# Patient Record
Sex: MALE | Race: WHITE | NOT HISPANIC OR LATINO | URBAN - METROPOLITAN AREA
[De-identification: names, ages, dates, MRNs, and addresses within clinical notes are randomized per-mention and may not be internally consistent; named-entity substitution may affect disease eponyms.]

---

## 2019-02-21 ENCOUNTER — OFFICE VISIT (OUTPATIENT)
Dept: URBAN - METROPOLITAN AREA CLINIC 29 | Facility: CLINIC | Age: 69
End: 2019-02-21
Payer: MEDICARE

## 2019-02-21 PROCEDURE — 99213 OFFICE O/P EST LOW 20 MIN: CPT | Performed by: OPTOMETRIST

## 2019-02-21 RX ORDER — ACYCLOVIR 400 MG/1
400 MG TABLET ORAL
Qty: 60 | Refills: 3 | Status: INACTIVE
Start: 2019-02-21 | End: 2019-05-01

## 2019-02-21 RX ORDER — GANCICLOVIR 1.5 MG/G
0.15 % GEL OPHTHALMIC
Qty: 1 | Refills: 4 | Status: INACTIVE
Start: 2019-02-21 | End: 2019-03-27

## 2019-02-21 NOTE — IMPRESSION/PLAN
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 Plan: Discussed diagnosis in detail with patient. Discussed treatment options with patient. New medication(s) Rx given today. Will continue to observe condition and or symptoms. Patient instructed to call if condition gets worse. Reassured patient of current condition and treatment.

## 2019-02-28 ENCOUNTER — OFFICE VISIT (OUTPATIENT)
Dept: URBAN - METROPOLITAN AREA CLINIC 29 | Facility: CLINIC | Age: 69
End: 2019-02-28
Payer: MEDICARE

## 2019-02-28 PROCEDURE — 99213 OFFICE O/P EST LOW 20 MIN: CPT | Performed by: OPTOMETRIST

## 2019-03-20 ENCOUNTER — OFFICE VISIT (OUTPATIENT)
Dept: URBAN - METROPOLITAN AREA CLINIC 29 | Facility: CLINIC | Age: 69
End: 2019-03-20
Payer: MEDICARE

## 2019-03-20 PROCEDURE — 99213 OFFICE O/P EST LOW 20 MIN: CPT | Performed by: OPTOMETRIST

## 2019-03-20 NOTE — IMPRESSION/PLAN
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 Plan: Discussed diagnosis in detail with patient. Discussed treatment options with patient. Continue using current medication(s). Taper medication(s) as instructed. Will continue to observe condition and or symptoms. Reassured patient of current condition and treatment.

## 2019-03-27 ENCOUNTER — OFFICE VISIT (OUTPATIENT)
Dept: URBAN - METROPOLITAN AREA CLINIC 29 | Facility: CLINIC | Age: 69
End: 2019-03-27
Payer: MEDICARE

## 2019-03-27 DIAGNOSIS — H25.13 AGE-RELATED NUCLEAR CATARACT, BILATERAL: ICD-10-CM

## 2019-03-27 PROCEDURE — 92014 COMPRE OPH EXAM EST PT 1/>: CPT | Performed by: OPTOMETRIST

## 2019-03-27 RX ORDER — GANCICLOVIR 1.5 MG/G
0.15 % GEL OPHTHALMIC
Qty: 1 | Refills: 4 | Status: INACTIVE
Start: 2019-03-27 | End: 2019-05-01

## 2019-03-27 ASSESSMENT — INTRAOCULAR PRESSURE
OS: 14
OD: 14

## 2019-03-27 NOTE — IMPRESSION/PLAN
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 Condition: established, stable. Plan: Discussed diagnosis in detail with patient. Discussed treatment options with patient. Taper medication(s) as instructed. Will continue to observe condition and or symptoms.

## 2019-03-27 NOTE — IMPRESSION/PLAN
Impression: Age-related nuclear cataract, bilateral: H25.13 OU. Condition: established, stable. Plan: Discussed diagnosis in detail with patient. No treatment is required at this time. Will continue to observe condition and or symptoms. Reassured patient of current condition and treatment. Call if Symptoms occur.

## 2019-05-01 ENCOUNTER — OFFICE VISIT (OUTPATIENT)
Dept: URBAN - METROPOLITAN AREA CLINIC 29 | Facility: CLINIC | Age: 69
End: 2019-05-01
Payer: MEDICARE

## 2019-05-01 PROCEDURE — 99213 OFFICE O/P EST LOW 20 MIN: CPT | Performed by: OPTOMETRIST

## 2019-05-01 NOTE — IMPRESSION/PLAN
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 Plan: Discussed diagnosis in detail with patient. Discussed treatment options with patient. Continue using current medication(s).

## 2019-05-22 ENCOUNTER — TESTING ONLY (OUTPATIENT)
Dept: URBAN - METROPOLITAN AREA CLINIC 29 | Facility: CLINIC | Age: 69
End: 2019-05-22

## 2019-05-22 DIAGNOSIS — H52.13 MYOPIA, BILATERAL: Primary | ICD-10-CM

## 2019-05-22 PROCEDURE — 92310 CONTACT LENS FITTING OU: CPT | Performed by: OPTOMETRIST

## 2019-05-22 ASSESSMENT — VISUAL ACUITY
OD: 20/40
OS: 20/20

## 2019-12-31 ENCOUNTER — OFFICE VISIT (OUTPATIENT)
Dept: URBAN - METROPOLITAN AREA CLINIC 29 | Facility: CLINIC | Age: 69
End: 2019-12-31
Payer: MEDICARE

## 2019-12-31 PROCEDURE — 99213 OFFICE O/P EST LOW 20 MIN: CPT | Performed by: OPTOMETRIST

## 2019-12-31 RX ORDER — GANCICLOVIR 1.5 MG/G
0.15 % GEL OPHTHALMIC
Qty: 1 | Refills: 4 | Status: INACTIVE
Start: 2019-12-31 | End: 2020-01-30

## 2019-12-31 NOTE — IMPRESSION/PLAN
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 Plan: Discussed diagnosis in detail with patient. Discussed treatment options with patient. No change to current treatment. Will continue to observe condition and or symptoms. Continue using current medication(s). Medication refill given today. Emphasized and explained compliance.

## 2020-01-30 ENCOUNTER — OFFICE VISIT (OUTPATIENT)
Dept: URBAN - METROPOLITAN AREA CLINIC 29 | Facility: CLINIC | Age: 70
End: 2020-01-30
Payer: MEDICARE

## 2020-01-30 DIAGNOSIS — B00.52 HERPESVIRAL KERATITIS: Primary | ICD-10-CM

## 2020-01-30 PROCEDURE — 99213 OFFICE O/P EST LOW 20 MIN: CPT | Performed by: OPTOMETRIST

## 2020-01-30 NOTE — IMPRESSION/PLAN
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 Condition: established, stable. Plan: Discussed diagnosis in detail with patient. Discussed treatment options with patient. Taper medication(s) and or Discontinue Med(s) as instructed.

## 2020-03-20 ENCOUNTER — OFFICE VISIT (OUTPATIENT)
Dept: URBAN - METROPOLITAN AREA CLINIC 29 | Facility: CLINIC | Age: 70
End: 2020-03-20
Payer: MEDICARE

## 2020-03-20 DIAGNOSIS — S05.01XA CORNEAL ABRASION W/O FB OF RIGHT EYE, INITIAL ENCOUNTER: Primary | ICD-10-CM

## 2020-03-20 PROCEDURE — 99213 OFFICE O/P EST LOW 20 MIN: CPT | Performed by: OPTOMETRIST

## 2020-03-20 NOTE — IMPRESSION/PLAN
Impression: Corneal abrasion w/o FB of right eye, initial encounter: S05.01xA. OD. Condition: self limited, minor problem. Plan: Discussed diagnosis in detail with patient. Discussed treatment options with patient. Patient instructed to use artificial tears as instructed. Use ointment at night as instructed. Will continue to observe condition and or symptoms.

## 2020-09-22 ENCOUNTER — OFFICE VISIT (OUTPATIENT)
Dept: URBAN - METROPOLITAN AREA CLINIC 29 | Facility: CLINIC | Age: 70
End: 2020-09-22
Payer: MEDICARE

## 2020-09-22 PROCEDURE — 99213 OFFICE O/P EST LOW 20 MIN: CPT | Performed by: OPTOMETRIST

## 2020-09-22 NOTE — IMPRESSION/PLAN
Impression: Corneal abrasion w/o FB of right eye, initial encounter: S05. 01XA OD. Plan: Discussed diagnosis in detail with patient. Discussed treatment options with patient. Patient instructed to use artificial tears as instructed. Use ointment at night as instructed. Will continue to observe condition and or symptoms. Patient instructed to call if condition gets worse.

## 2020-09-30 ENCOUNTER — OFFICE VISIT (OUTPATIENT)
Dept: URBAN - METROPOLITAN AREA CLINIC 29 | Facility: CLINIC | Age: 70
End: 2020-09-30
Payer: MEDICARE

## 2020-09-30 PROCEDURE — 99213 OFFICE O/P EST LOW 20 MIN: CPT | Performed by: OPTOMETRIST

## 2020-09-30 NOTE — IMPRESSION/PLAN
Impression: Corneal abrasion w/o FB of right eye, initial encounter: S05. 01XA OD. Condition: resolved. Plan: Discussed diagnosis in detail with patient. Discussed treatment options with patient. Patient instructed to use artificial tears as instructed. Use ointment at night as instructed. Will continue to observe condition and or symptoms. Patient instructed to call if condition gets worse. Patient ok to continue with CL.

## 2020-10-29 ENCOUNTER — OFFICE VISIT (OUTPATIENT)
Dept: URBAN - METROPOLITAN AREA CLINIC 29 | Facility: CLINIC | Age: 70
End: 2020-10-29

## 2020-10-29 PROCEDURE — 92310 CONTACT LENS FITTING OU: CPT | Performed by: OPTOMETRIST

## 2020-10-29 ASSESSMENT — VISUAL ACUITY
OD: 20/60
OS: 20/20

## 2020-10-29 NOTE — IMPRESSION/PLAN
Impression: Myopia, bilateral: H52.13. Plan: Discussed diagnosis in detail with patient. Discussed treatment options with patient. New glasses Rx was given today. Patient given new CLs today.

## 2021-07-16 ENCOUNTER — OFFICE VISIT (OUTPATIENT)
Dept: URBAN - METROPOLITAN AREA CLINIC 29 | Facility: CLINIC | Age: 71
End: 2021-07-16
Payer: MEDICARE

## 2021-07-16 PROCEDURE — 99213 OFFICE O/P EST LOW 20 MIN: CPT | Performed by: OPTOMETRIST

## 2021-07-16 RX ORDER — GANCICLOVIR 1.5 MG/G
0.15 % GEL OPHTHALMIC
Qty: 1 | Refills: 4 | Status: INACTIVE
Start: 2021-07-16 | End: 2021-07-26

## 2021-07-16 NOTE — IMPRESSION/PLAN
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 Plan: Discussed diagnosis in detail with patient. Discussed treatment options with patient. New medication(s) Rx given today, Zirgan x5 OD. Will continue to observe condition and or symptoms. Patient instructed to call if condition gets worse. Reassured patient of current condition and treatment.

## 2021-07-23 ENCOUNTER — OFFICE VISIT (OUTPATIENT)
Dept: URBAN - METROPOLITAN AREA CLINIC 29 | Facility: CLINIC | Age: 71
End: 2021-07-23
Payer: MEDICARE

## 2021-07-23 PROCEDURE — 99213 OFFICE O/P EST LOW 20 MIN: CPT | Performed by: OPTOMETRIST

## 2021-07-23 NOTE — IMPRESSION/PLAN
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 Condition: resolved. Plan: Discussed diagnosis in detail with patient. Discussed treatment options with patient. Continue using current medication(s). Taper medication(s) as instructed. Will continue to observe condition and or symptoms. Emphasized and explained compliance. Patient instructed to call if condition gets worse. Patient to be out of CL for 1 month.